# Patient Record
(demographics unavailable — no encounter records)

---

## 2025-01-08 NOTE — HISTORY OF PRESENT ILLNESS
[Preoperative Visit] : for a medical evaluation prior to surgery [Good] : Good [Prior Anesthesia] : Prior anesthesia [Fever] : no fever [Chills] : no chills [Runny Nose] : no runny nose [Earache] : no earache [Headache] : no headache [Sore Throat] : no sore throat [Cough] : no cough [Appetite] : no decrease in appetite [Nausea] : no nausea [Vomiting] : no vomiting [Abdominal Pain] : no abdominal pain [Diarrhea] : no diarrhea [Easy Bruising] : no easy bruising [Rash] : no rash [Dysuria] : no dysuria [Urinary Frequency] : no urinary frequency [Prev Anesthesia Reaction] : no previous anesthesia reaction [Diabetes] : no diabetes [Pulmonary Disease] : no pulmonary disease [Renal Disease] : no renal disease [GI Disease] : no gastrointestinal disease [Sleep Apnea] : no sleep apnea [Transfusion Reaction] : no transfusion reaction [Impaired Immunity] : no impaired immunity [Frequent use of NSAIDs] : no use of NSAIDs [Anesthesia Reaction] : no anesthesia reaction [Clotting Disorder] : no clotting disorder [Bleeding Disorder] : no bleeding disorder [Sudden Death] : no sudden death [FreeTextEntry2] : 1/15/25 [de-identified] : [facility] [FreeTextEntry1] : 5y4m old boy BIB mother for preoperative clearance prior to dental work under anesthesia. Pt to have tooth extraction and possible root canal. No recent illness. No family or personal h/o problems with anesthesia. No current concerns.

## 2025-01-09 NOTE — HISTORY OF PRESENT ILLNESS
[de-identified] : Sore throat [FreeTextEntry6] : Patient is a 5-year-old male brought to office by mom for sore throat starting overnight.  Patient was seen in this office yesterday for medical clearance and was well.  Patient had Motrin during the night for severe throat pain.  Mom states patient has a slight croupy cough as well.  Patient has had no vomiting no diarrhea no rash.  There is strep throat currently in patient's class

## 2025-01-09 NOTE — DISCUSSION/SUMMARY
[FreeTextEntry1] : Discussed strep throat at length with mother.  Rapid strep test positive in the office. Start antibiotics today. May use Tylenol or Motrin p.r.n. pain or fever. Call immediately for any worsening of signs or symptoms. Parent understands the plan.

## 2025-01-28 NOTE — DISCUSSION/SUMMARY
[FreeTextEntry1] : Anticipatory guidance and parent education given. Rapid strep test negative in office, throat culture sent to lab. Will follow up by phone and initiate antibiotic treatment if throat culture results are positive. 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 Advise continued psychotherapy, consider psychiatry evaluation in the future. Neurology referral made.  Obtain labs as ordered. F/U as needed.

## 2025-01-28 NOTE — HISTORY OF PRESENT ILLNESS
[de-identified] : f/u strep/?KASHIFAS [FreeTextEntry6] : 5y4m old boy BIB mother for follow up s/p treatment for strep throat dx about 2 weeks ago. Pt with h/o anxiety and some OCD behaviors but mother noticed an increase in these issues around the time of the strep infection. Pt was refusing to go to school, had increased tics and OCD quirks as per mother. Pt is in therapy. Pt's sx have decreased since completing the antibiotic therapy. No fever. No sore throat, abdominal pain or rash.

## 2025-05-05 NOTE — REVIEW OF SYSTEMS
[Fever] : fever [Chills] : no chills [Headache] : no headache [Eye Discharge] : no eye discharge [Eye Redness] : no eye redness [Ear Pain] : no ear pain [Nasal Congestion] : nasal congestion [Sore Throat] : sore throat [Tachypnea] : not tachypneic [Wheezing] : no wheezing [Cough] : cough [Shortness of Breath] : no shortness of breath [Negative] : Genitourinary

## 2025-05-05 NOTE — DISCUSSION/SUMMARY
[FreeTextEntry1] : Anticipatory guidance and parent education given. Rapid strep test negative in office, throat culture sent to lab. Will follow up by phone and initiate antibiotic treatment if throat culture results are positive. Recommend using mist from a humidifier.  Allow the child to breathe cool air during the night by opening a window or door.  Fever can be treated with an over-the-counter medication such as acetaminophen or ibuprofen.  Coughing can be treated with warm, clear fluids to loosen mucus on the vocal cords. Warm water, apple juice, or lemonade is safe for children older than four months.  Frozen juice popsicles also can be given. Keep the child's head elevated. If the child's stridor does not improve contact health care provider immediately. Follow up as needed for persistent or worsening symptoms.

## 2025-05-05 NOTE — HISTORY OF PRESENT ILLNESS
[de-identified] : fever, cough [FreeTextEntry6] : 5y8m old boy BIB mother with c/o fever to 102, sore throat and barky cough since last night. Sister with same sx at home. No fever. No SOB, difficulty breathing, chest pain, stridor or wheeze. No n/v/d. No headache, abdominal pain, or rash. No body aches or fatigue. No urinary complaints. Good po/uop/bm. Normal sleep and activity.

## 2025-05-13 NOTE — DISCUSSION/SUMMARY
[FreeTextEntry1] : Anticipatory guidance and parent education given. Recommend using mist from a humidifier.  Allow the child to breathe cool air during the night by opening a window or door.  Fever can be treated with an over-the-counter medication such as acetaminophen or ibuprofen.  Coughing can be treated with warm, clear fluids to loosen mucus on the vocal cords. Warm water, apple juice, or lemonade is safe for children older than four months.  Frozen juice popsicles also can be given. Keep the child's head elevated. If the child's stridor does not improve contact health care provider immediately. Take oral Prednisolone as prescribed. Supportive care. Follow up as needed for persistent or worsening symptoms. Parent understands and agrees with plan.

## 2025-05-13 NOTE — HISTORY OF PRESENT ILLNESS
[de-identified] : barky cough [FreeTextEntry6] : 5y8m old Boy BIB mother with c/o persistent barky cough and stridor with activity over the past week. Pt is getting over a febrile URI, has not had fever in about 1 week but cough has not gotten much better. No fever. No n/v/d. No headache, abdominal pain, sore throat or rash. No body aches or fatigue. No urinary complaints. Good po/uop/bm. Normal sleep and activity.

## 2025-07-05 NOTE — DISCUSSION/SUMMARY
[FreeTextEntry1] : Anticipatory guidance and parent education given. Rapid strep test negative in office, throat culture sent to lab. Will follow up by phone and initiate antibiotic treatment if throat culture results are positive. Advise supportive care. Tylenol or Motrin as needed for pain or fever. Increase fluids, rest. F/U psychology, recommend psychiatry evaluation. Neurology referral made. Follow up if symptoms persist or worsen.

## 2025-07-05 NOTE — HISTORY OF PRESENT ILLNESS
[de-identified] : ?strep [FreeTextEntry6] : 5y10m old boy BIB mother with concerns for strep infection. Pt with increased throat clearing, tics and anxiety over the past few weeks. Pt was doing very well as per mother then behavior abruptly changed again recently. Pt is in therapy. Mother describes increased incidence of irrational fears, anxiety and intrusive thoughts. No fever/v/d. No URI sx or sore throat. No rash or headache. Good po/uop/bm. Normal sleep.

## 2025-07-05 NOTE — PHYSICAL EXAM
[Erythematous Oropharynx] : erythematous oropharynx [NL] : warm, clear [Enlarged Tonsils] : tonsils not enlarged [Exudate] : no exudate

## 2025-07-21 NOTE — PHYSICAL EXAM
[Well-appearing] : well-appearing [Normocephalic] : normocephalic [No dysmorphic facial features] : no dysmorphic facial features [No ocular abnormalities] : no ocular abnormalities [No abnormal neurocutaneous stigmata or skin lesions] : no abnormal neurocutaneous stigmata or skin lesions [Straight] : straight [No deformities] : no deformities [Alert] : alert [Well related, good eye contact] : well related, good eye contact [2+ biceps] : 2+ biceps [Triceps] : triceps [Knee jerks] : knee jerks [Ankle jerks] : ankle jerks [No ankle clonus] : no ankle clonus [de-identified] : respirations appear regular and unlabored  [de-identified] : abdomen does not appear distended  [de-identified] : Casual speech was fluent. He was able to follow simple commands. Naming was intact. Memory: registration 2/3, recall 0/3 to 2/3 with multiple choice format [de-identified] : pupils are equal, round and reactive to light, visual fields were intact to confrontation, eye movements were full with no nystagmus, funduscopic exam revealed sharp disc margins, facial sensation intact to touch and/or temperature, facial strength was normal, hearing intact to soft finger rub and/or 128 Hz tuning fork, palate elevated symmetrically with phonation, cephalic version and/or shoulder shrug exhibited normal strength, tongue protruded in the midline  [de-identified] : no pronator drift was noted, normal resistance to passive manipulation was demonstrated, muscle strength was normal both proximally and distally  [de-identified] : casual gait was narrow based, able to walk on heels and toes, tandem gait intact  [de-identified] : finger to nose were intact, fast finger movements were brisk, rhythmic and symmetric

## 2025-07-21 NOTE — ASSESSMENT
[FreeTextEntry1] : NIA is experiencing disabling anxiety of acute onset. Cognitive and motor dysfunction of milder degree is reported but neurological examination is normal.   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 PANDAS and PANS represent intriguing but unproven hypotheses regarding an immune mediated basis for certain tic disorders and behavioral disturbances. Immunomodulatory treatments for PANS/PANDAS are not scientifically validated. Validated treatments would include cognitive behavioral therapy and appropriate psychotropic medications.   In the absence of psychosis, seizures or movement disorder, autoimmune encephalitis is felt to be less likely. However, the psychiatric symptoms are severe and disabling and some features of cognitive dysfunction are noted. The risks and benefits of an comprehensive evaluation to exclude AE were carefully considered and discussed. Mother wishes to proceed with this evaluation.   "However, we agree that in the small subset of such children with the acute onset of psychiatric symptoms that are also both severe and substantially disabling, the incentive to search for an etiology is compelling. If a psychiatrist or pediatrician believes it is appropriate in an individual case to pursue medical diagnostic testing, we propose following the individualized approach above for children with severe psychiatric plus neurological symptoms, to include CSF and neuroimaging studies and not solely blood testing. If such testing provides strong evidence of inflammation or other possible medical causes, involvement of neurology, rheumatology, infectious disease, or other subspecialties is appropriate."  A Pediatric Neurology Perspective on Pediatric Autoimmune Neuropsychiatric Disorder Associated with Streptococcal Infection and Pediatric Acute-Onset Neuropsychiatric Syndrome Jp Conn MD, MS, FAAP, FAAN, Harlan Ashraf MD, PhD, FAAN, NORY, FAAP, and Figueroa Moore MD

## 2025-07-21 NOTE — HISTORY OF PRESENT ILLNESS
[FreeTextEntry1] : I had the opportunity to see your patient, NIA ERIC, in consultation for the first time.   Identification: 5 year male   Chief complaint: Acute behavioral change.  History of present illness: Onset of behavioral changes and cognitive dysfunction acutely on . No antecedent illness except for scheduled dental extraction. Behavioral manifestations have been worsening. Behaviors of concern include excessive and irrational features of fires, bombing, storms, darkness. He is unable to attempt camp due to intense fears about fires, storms or bombing. He vividly imagines the bombers that would conduct the bomb attacks but visual hallucinations do not seem to be occurring. Auditory hallucinations are denied. These fears are very disruptive leading to inability to attend camp, leave the home and sleep independently. Severe separation anxiety is reported. He was able to engage in all of these activities in the past. Impulsive anger and behavioral outburst are reported. Co sleeping is required due to fear of the dark. He awakens from sleep with intense fears. Daytime sleepiness is denied. Nocturnal enuresis was reported initially but this is currently at baseline for him.  For ~ 1 week after the onset of symptoms he was experiencing intense motion sickness with even brief car trips. Cognitive dysfunction was also noted for about 1 week including word finding difficulties, deterioration in handwriting and over-responsivity to sensory stimuli such as certain sounds. Both fine and gross motor functions were also impaired with difficulty dressing and atypical clumsiness noted. These symptoms have improved but not resolved. His appetite has remained good. No abnormal involuntary movements have been observed. He does exhibit some repetitive behaviors such as nail biting and chewing on his fingers.   Similar symptoms were noted last year in temporal proximity to a streptococcal pharyngitis that was treated with antibiotics. He had persistent anxiety and obsessive/compulsive behaviors such as collecting that lasted for several months. CBT provided in consultation with a LMHP was felt to be very helpful. This therapy was terminated.  Paraclinical studies: refer to EMR. Rapid strep and culture were negative. ASO titers and Lyme titers negative.  He has seen a "functional" APRN who had recommended treatment ibuprofen. This has been of unclear benefit.    history: Pregnancy, birth and  course were uncomplicated.  Development: Acquisition of developmental milestones was reported as normal.  Behavior: see above.  Education: No academic concerns were reported.  Sleep: see above.  Medical/Surgical history: No prior history of serious head injury, meningoencephalitis or seizures is reported. Food allergies noted early in life. Cyst surgically removed from external ear.   Medications: Ibuprofen  Allergies: NKDA  Family history: Maternal aunt with mental illness including OCD.  Social history: Lives with family.  Review of systems: See below.

## 2025-07-21 NOTE — PLAN
How Severe Is Your Condition?: mild Year Removed: 1900 Additional History: Lesion has changed in color recently with lighter color in the center with more reddish color. Also lesion occasionally itches. [FreeTextEntry1] : Admit for inpatient evaluation to include MRI imaging of the brain with and without contrast, extended video EEG recording and CSF analysis to include autoimmune encephalopathy panel, [facility]. Empirical immunomodulatory therapy only if encephalitis is demonstrated on CSF analysis and MR imaging.

## 2025-07-30 NOTE — HISTORY OF PRESENT ILLNESS
[de-identified] : hospital follow up [FreeTextEntry6] : 5y10 month old boy BIB mother for follow up s/p brief hospitalization for spinal tap and EEG. Pt with h/o increasing anxiety, OCD and escalating/excessive fear of death/disasters/etc. over this past month.  A few weeks ago pt with difficulty writing his name and finding words. Pt has not been able to go to camp or leave the house comfortably due to these issues. Seen by neurology who recommended inpatient work-up to r/o autoimmune encephalitis. EEG showed some irregularity but no seizures. Incidental heart murmur noted on admission. Pt is in therapy and mother has scheduled a psychiatry evaluation. He was recently seen by a functional medicine doctor and multiple labs were obtained. Pt was place on Ibuprofen Q6h for "inflammation" and treated empirically with Cefdinir (he is on day #4).

## 2025-07-30 NOTE — PHYSICAL EXAM
[Regular Rate and Rhythm] : regular rate and rhythm [Normal S1, S2 audible] : normal S1, S2 audible [NL] : warm, clear [Tachycardia] : no tachycardia [FreeTextEntry8] : I-II/VI DISHA@B [de-identified] : no focal deficits

## 2025-07-30 NOTE — DISCUSSION/SUMMARY
[FreeTextEntry1] : Anticipatory guidance and parent education given. Spoke with mother at length. Referral to cardiology for evaluation of heart murmur. Continue psychotherapy/CBT. F/U psychiatry as planned. Referral to immunology at mother's request. F/U neurology. Follow up as needed for persistent or worsening symptoms.